# Patient Record
Sex: FEMALE | Race: WHITE | Employment: FULL TIME | URBAN - METROPOLITAN AREA
[De-identification: names, ages, dates, MRNs, and addresses within clinical notes are randomized per-mention and may not be internally consistent; named-entity substitution may affect disease eponyms.]

---

## 2024-03-12 LAB
ALBUMIN SERPL-MCNC: 4.1 G/DL (ref 3.5–5.2)
ALP SERPL-CCNC: 99 U/L (ref 35–104)
ALT SERPL-CCNC: <5 U/L (ref 0–32)
ANION GAP SERPL CALCULATED.3IONS-SCNC: 14 MMOL/L (ref 7–16)
AST SERPL-CCNC: 10 U/L (ref 0–31)
BACTERIA URNS QL MICRO: ABNORMAL
BASOPHILS # BLD: 0.06 K/UL (ref 0–0.2)
BASOPHILS NFR BLD: 1 % (ref 0–2)
BILIRUB SERPL-MCNC: 0.4 MG/DL (ref 0–1.2)
BILIRUB UR QL STRIP: NEGATIVE
BUN SERPL-MCNC: 22 MG/DL (ref 6–20)
CALCIUM SERPL-MCNC: 9.3 MG/DL (ref 8.6–10.2)
CHLORIDE SERPL-SCNC: 106 MMOL/L (ref 98–107)
CLARITY UR: CLEAR
CO2 SERPL-SCNC: 22 MMOL/L (ref 22–29)
COLOR UR: YELLOW
CREAT SERPL-MCNC: 0.7 MG/DL (ref 0.5–1)
CRYSTALS URNS MICRO: ABNORMAL /HPF
EOSINOPHIL # BLD: 0.14 K/UL (ref 0.05–0.5)
EOSINOPHILS RELATIVE PERCENT: 1 % (ref 0–6)
EPI CELLS #/AREA URNS HPF: ABNORMAL /HPF
ERYTHROCYTE [DISTWIDTH] IN BLOOD BY AUTOMATED COUNT: 14.3 % (ref 11.5–15)
GFR SERPL CREATININE-BSD FRML MDRD: >60 ML/MIN/1.73M2
GLUCOSE SERPL-MCNC: 79 MG/DL (ref 74–99)
GLUCOSE UR STRIP-MCNC: NEGATIVE MG/DL
HCT VFR BLD AUTO: 40.2 % (ref 34–48)
HGB BLD-MCNC: 12.5 G/DL (ref 11.5–15.5)
HGB UR QL STRIP.AUTO: ABNORMAL
IMM GRANULOCYTES # BLD AUTO: 0.03 K/UL (ref 0–0.58)
IMM GRANULOCYTES NFR BLD: 0 % (ref 0–5)
KETONES UR STRIP-MCNC: NEGATIVE MG/DL
LEUKOCYTE ESTERASE UR QL STRIP: NEGATIVE
LYMPHOCYTES NFR BLD: 2.37 K/UL (ref 1.5–4)
LYMPHOCYTES RELATIVE PERCENT: 24 % (ref 20–42)
MCH RBC QN AUTO: 29 PG (ref 26–35)
MCHC RBC AUTO-ENTMCNC: 31.1 G/DL (ref 32–34.5)
MCV RBC AUTO: 93.3 FL (ref 80–99.9)
MONOCYTES NFR BLD: 0.83 K/UL (ref 0.1–0.95)
MONOCYTES NFR BLD: 8 % (ref 2–12)
NEUTROPHILS NFR BLD: 65 % (ref 43–80)
NEUTS SEG NFR BLD: 6.42 K/UL (ref 1.8–7.3)
NITRITE UR QL STRIP: NEGATIVE
PH UR STRIP: 5 [PH] (ref 5–9)
PLATELET # BLD AUTO: 297 K/UL (ref 130–450)
PMV BLD AUTO: 10 FL (ref 7–12)
POTASSIUM SERPL-SCNC: 4.1 MMOL/L (ref 3.5–5)
PROT SERPL-MCNC: 7.5 G/DL (ref 6.4–8.3)
PROT UR STRIP-MCNC: NEGATIVE MG/DL
RBC # BLD AUTO: 4.31 M/UL (ref 3.5–5.5)
RBC #/AREA URNS HPF: ABNORMAL /HPF
SODIUM SERPL-SCNC: 142 MMOL/L (ref 132–146)
SP GR UR STRIP: >1.03 (ref 1–1.03)
UROBILINOGEN UR STRIP-ACNC: 0.2 EU/DL (ref 0–1)
WBC #/AREA URNS HPF: ABNORMAL /HPF
WBC OTHER # BLD: 9.9 K/UL (ref 4.5–11.5)

## 2024-06-01 ENCOUNTER — HOSPITAL ENCOUNTER (EMERGENCY)
Age: 54
Discharge: OTHER INSTITUTION | End: 2024-06-02
Attending: EMERGENCY MEDICINE
Payer: MEDICARE

## 2024-06-01 DIAGNOSIS — F39 MOOD DISORDER (HCC): Primary | ICD-10-CM

## 2024-06-01 LAB
ALBUMIN SERPL-MCNC: 4.1 G/DL (ref 3.5–5.2)
ALP SERPL-CCNC: 91 U/L (ref 35–104)
ALT SERPL-CCNC: 7 U/L (ref 0–32)
AMPHET UR QL SCN: NEGATIVE
ANION GAP SERPL CALCULATED.3IONS-SCNC: 15 MMOL/L (ref 7–16)
APAP SERPL-MCNC: <5 UG/ML (ref 10–30)
AST SERPL-CCNC: 10 U/L (ref 0–31)
BARBITURATES UR QL SCN: NEGATIVE
BASOPHILS # BLD: 0.04 K/UL (ref 0–0.2)
BASOPHILS NFR BLD: 1 % (ref 0–2)
BENZODIAZ UR QL: NEGATIVE
BILIRUB SERPL-MCNC: 0.2 MG/DL (ref 0–1.2)
BILIRUB UR QL STRIP: NEGATIVE
BUN SERPL-MCNC: 15 MG/DL (ref 6–20)
BUPRENORPHINE UR QL: NEGATIVE
CALCIUM SERPL-MCNC: 9.2 MG/DL (ref 8.6–10.2)
CANNABINOIDS UR QL SCN: NEGATIVE
CHLORIDE SERPL-SCNC: 102 MMOL/L (ref 98–107)
CLARITY UR: CLEAR
CO2 SERPL-SCNC: 21 MMOL/L (ref 22–29)
COCAINE UR QL SCN: NEGATIVE
COLOR UR: YELLOW
COMMENT: NORMAL
CREAT SERPL-MCNC: 0.8 MG/DL (ref 0.5–1)
EKG ATRIAL RATE: 50 BPM
EKG P AXIS: 35 DEGREES
EKG P-R INTERVAL: 146 MS
EKG Q-T INTERVAL: 456 MS
EKG QRS DURATION: 88 MS
EKG QTC CALCULATION (BAZETT): 415 MS
EKG R AXIS: -11 DEGREES
EKG T AXIS: 21 DEGREES
EKG VENTRICULAR RATE: 50 BPM
EOSINOPHIL # BLD: 0.1 K/UL (ref 0.05–0.5)
EOSINOPHILS RELATIVE PERCENT: 1 % (ref 0–6)
ERYTHROCYTE [DISTWIDTH] IN BLOOD BY AUTOMATED COUNT: 13.9 % (ref 11.5–15)
ETHANOLAMINE SERPL-MCNC: <10 MG/DL (ref 0–0.08)
FENTANYL UR QL: NEGATIVE
GFR, ESTIMATED: >90 ML/MIN/1.73M2
GLUCOSE SERPL-MCNC: 88 MG/DL (ref 74–99)
GLUCOSE UR STRIP-MCNC: NEGATIVE MG/DL
HCT VFR BLD AUTO: 40.5 % (ref 34–48)
HGB BLD-MCNC: 12.9 G/DL (ref 11.5–15.5)
HGB UR QL STRIP.AUTO: NEGATIVE
IMM GRANULOCYTES # BLD AUTO: <0.03 K/UL (ref 0–0.58)
IMM GRANULOCYTES NFR BLD: 0 % (ref 0–5)
KETONES UR STRIP-MCNC: NEGATIVE MG/DL
LEUKOCYTE ESTERASE UR QL STRIP: NEGATIVE
LYMPHOCYTES NFR BLD: 3.04 K/UL (ref 1.5–4)
LYMPHOCYTES RELATIVE PERCENT: 38 % (ref 20–42)
MCH RBC QN AUTO: 29.1 PG (ref 26–35)
MCHC RBC AUTO-ENTMCNC: 31.9 G/DL (ref 32–34.5)
MCV RBC AUTO: 91.4 FL (ref 80–99.9)
METHADONE UR QL: NEGATIVE
MONOCYTES NFR BLD: 0.71 K/UL (ref 0.1–0.95)
MONOCYTES NFR BLD: 9 % (ref 2–12)
NEUTROPHILS NFR BLD: 51 % (ref 43–80)
NEUTS SEG NFR BLD: 4.03 K/UL (ref 1.8–7.3)
NITRITE UR QL STRIP: NEGATIVE
OPIATES UR QL SCN: NEGATIVE
OXYCODONE UR QL SCN: NEGATIVE
PCP UR QL SCN: NEGATIVE
PH UR STRIP: 6 [PH] (ref 5–9)
PLATELET # BLD AUTO: 242 K/UL (ref 130–450)
PMV BLD AUTO: 9.4 FL (ref 7–12)
POTASSIUM SERPL-SCNC: 3.3 MMOL/L (ref 3.5–5)
PROT SERPL-MCNC: 6.7 G/DL (ref 6.4–8.3)
PROT UR STRIP-MCNC: NEGATIVE MG/DL
RBC # BLD AUTO: 4.43 M/UL (ref 3.5–5.5)
SALICYLATES SERPL-MCNC: <0.3 MG/DL (ref 0–30)
SODIUM SERPL-SCNC: 138 MMOL/L (ref 132–146)
SP GR UR STRIP: 1.01 (ref 1–1.03)
TEST INFORMATION: NORMAL
TOXIC TRICYCLIC SC,BLOOD: NEGATIVE
TROPONIN I SERPL HS-MCNC: 8 NG/L (ref 0–9)
UROBILINOGEN UR STRIP-ACNC: 0.2 EU/DL (ref 0–1)
WBC OTHER # BLD: 7.9 K/UL (ref 4.5–11.5)

## 2024-06-01 PROCEDURE — 93010 ELECTROCARDIOGRAM REPORT: CPT | Performed by: INTERNAL MEDICINE

## 2024-06-01 PROCEDURE — 80307 DRUG TEST PRSMV CHEM ANLYZR: CPT

## 2024-06-01 PROCEDURE — 85025 COMPLETE CBC W/AUTO DIFF WBC: CPT

## 2024-06-01 PROCEDURE — 81003 URINALYSIS AUTO W/O SCOPE: CPT

## 2024-06-01 PROCEDURE — 80179 DRUG ASSAY SALICYLATE: CPT

## 2024-06-01 PROCEDURE — 99285 EMERGENCY DEPT VISIT HI MDM: CPT

## 2024-06-01 PROCEDURE — 6370000000 HC RX 637 (ALT 250 FOR IP): Performed by: EMERGENCY MEDICINE

## 2024-06-01 PROCEDURE — 93005 ELECTROCARDIOGRAM TRACING: CPT | Performed by: EMERGENCY MEDICINE

## 2024-06-01 PROCEDURE — 84484 ASSAY OF TROPONIN QUANT: CPT

## 2024-06-01 PROCEDURE — 80053 COMPREHEN METABOLIC PANEL: CPT

## 2024-06-01 PROCEDURE — 80143 DRUG ASSAY ACETAMINOPHEN: CPT

## 2024-06-01 PROCEDURE — G0480 DRUG TEST DEF 1-7 CLASSES: HCPCS

## 2024-06-01 RX ORDER — FLUTICASONE PROPIONATE AND SALMETEROL 250; 50 UG/1; UG/1
1 POWDER RESPIRATORY (INHALATION) 2 TIMES DAILY
COMMUNITY

## 2024-06-01 RX ORDER — CLONAZEPAM 0.5 MG/1
0.5 TABLET ORAL ONCE
Status: DISCONTINUED | OUTPATIENT
Start: 2024-06-01 | End: 2024-06-02

## 2024-06-01 RX ORDER — ALBUTEROL SULFATE 90 UG/1
2 AEROSOL, METERED RESPIRATORY (INHALATION) EVERY 6 HOURS PRN
COMMUNITY

## 2024-06-01 RX ORDER — CITALOPRAM 40 MG/1
40 TABLET ORAL DAILY
COMMUNITY

## 2024-06-01 RX ORDER — CLONAZEPAM 0.5 MG/1
0.5 TABLET ORAL 2 TIMES DAILY PRN
COMMUNITY

## 2024-06-01 RX ORDER — PRAVASTATIN SODIUM 20 MG
20 TABLET ORAL
COMMUNITY

## 2024-06-01 RX ORDER — TRAZODONE HYDROCHLORIDE 50 MG/1
100 TABLET ORAL ONCE
Status: COMPLETED | OUTPATIENT
Start: 2024-06-01 | End: 2024-06-01

## 2024-06-01 RX ORDER — TRAZODONE HYDROCHLORIDE 100 MG/1
100 TABLET ORAL NIGHTLY
COMMUNITY

## 2024-06-01 RX ORDER — LISINOPRIL 20 MG/1
30 TABLET ORAL DAILY
COMMUNITY

## 2024-06-01 RX ORDER — LORAZEPAM 1 MG/1
2 TABLET ORAL ONCE
Status: COMPLETED | OUTPATIENT
Start: 2024-06-01 | End: 2024-06-01

## 2024-06-01 RX ORDER — PRAVASTATIN SODIUM 20 MG
20 TABLET ORAL ONCE
Status: COMPLETED | OUTPATIENT
Start: 2024-06-01 | End: 2024-06-01

## 2024-06-01 RX ADMIN — POTASSIUM BICARBONATE 40 MEQ: 782 TABLET, EFFERVESCENT ORAL at 09:39

## 2024-06-01 RX ADMIN — PRAVASTATIN SODIUM 20 MG: 20 TABLET ORAL at 22:59

## 2024-06-01 RX ADMIN — TRAZODONE HYDROCHLORIDE 100 MG: 50 TABLET ORAL at 22:58

## 2024-06-01 RX ADMIN — LORAZEPAM 2 MG: 1 TABLET ORAL at 09:39

## 2024-06-01 ASSESSMENT — LIFESTYLE VARIABLES
HOW MANY STANDARD DRINKS CONTAINING ALCOHOL DO YOU HAVE ON A TYPICAL DAY: PATIENT DOES NOT DRINK
HOW OFTEN DO YOU HAVE A DRINK CONTAINING ALCOHOL: NEVER

## 2024-06-01 ASSESSMENT — PAIN - FUNCTIONAL ASSESSMENT: PAIN_FUNCTIONAL_ASSESSMENT: NONE - DENIES PAIN

## 2024-06-01 NOTE — ED NOTES
KRYSTA called [facility] Crisis unit ([phone]) and spoke with anaid Patel about referral.     Referral to be faxed to [phone]

## 2024-06-01 NOTE — ED NOTES
KRYSTA spoke with ZOYA Arteaga to update her on the status of pt. KRYSTA Real and Antonia consulted with navigator Maryam to gather more information on pt. SW will make calls to pt's county to developmental disabilities board and group home in efforts to find out if she is connected with board services.     SW spoke with pt who was yelling, stating that she wanted to leave. She expressed that she wanted to go back to the [facility] in [place] because she could at least shower. She also stated that she was being starved, she does not like turkey and refused to eat the meal provided, observed and acknowledged by SW that her food was sitting by bedside.     Pt expressed not wanting to go back to the previous group home because she does not like it. She has a brother and sister-in-law who lives in [place] but they will not allow her to leave there and she does not get along with her brother. Pt provided SW with mom's contact number, Esther Corie, [phone] who is in the [facility].

## 2024-06-01 NOTE — ED NOTES
received a call back from [facility] and spoke to Kimani.  was informed patient arrived yesterday evening around 7 PM with no medications. Patient was exhibiting odd behavior including repeating the same information over and over again, asking for her mother/family, talking to herself, going into the bathroom every 5 minutes and leaving the door open and overall unable to care for herself. Kimani states patient is unable to do things on her own or care for herself including showering (patient reportedly asked for assistance).      was informed patient is not permitted to return due to \"it is more than what we are able to handle.\" Patient does not violent at all.

## 2024-06-01 NOTE — ED NOTES
SW called [facility] [phone] in [facility]. Mailbox is full.     SW called [facility] board of Developmental Disabilities. [phone]. SW left message.     SW called after hours number [phone], [facility] board of Developmental Disabilities. Spoke with Annabella who will provide message to on- and will return SW's call.     6:10 pm, SW received a call back from Tee Louis, MYA on-call who stated that there is a profile for pt that states that she is ineligible for services. Tee did not have any other information to provide SW at the time.     KRYSTA called pt's mom, Esther Fontenot, at [phone], Jake per verbal consent by pt. SW was on hold for over 8 minutes. KRYSTA will call back if needed.     KRYSTA called group home [facility] [place] -  at [phone]    Gaviota stated that pt was at [facility]. She visited [facility] and said that she wanted to move in there.[facility]  \"moved her in and pt went -, staying that she didn't agree to move in and left. Gaviota does not habe any concerns for SI or HI. She reported that she believes that pt may be delayed and not probably diagnosed. [facility]iota knows that pt had been living with mom and dad all of her life and mom moved into nursing home and could no longer care for. Gaviota does not know how pt got to [place]. Gaviota provided contact infromation for pt's brother, Enio, [phone].
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
## 2024-06-01 NOTE — ED PROVIDER NOTES
HPI:  6/1/24, Time: 1:33 AM EDT         Annette Fontenot is a 54 y.o. female anxiety depression presenting to the ED for needing medication refill, beginning 1 week ago.  The complaint has been persistent, moderate in severity, and worsened by nothing.  Patient reports that she is been out of her medications for a week.  Patient reporting she does have high cholesterol as well as well as anxiety.  Patient reporting feeling anxious and depressed for several weeks she reports no thoughts of harming self she reports no fever no chills she reports no abdominal pain or vomiting is no diarrhea there is no black or tarry stools or urinary symptoms.  [facility]    ROS:   Pertinent positives and negatives are stated within HPI, all other systems reviewed and are negative.  --------------------------------------------- PAST HISTORY ---------------------------------------------  Past Medical History:  has a past medical history of Anxiety and Depression.    Past Surgical History:  has no past surgical history on file.    Social History:  reports that she has never smoked. She has never used smokeless tobacco. She reports that she does not drink alcohol.    Family History: family history is not on file.     The patient’s home medications have been reviewed.    Allergies: Cogentin [benztropine], Haldol [haloperidol], and Zoloft [sertraline]    ---------------------------------------------------PHYSICAL EXAM--------------------------------------    Constitutional/General: Alert and oriented x3, anxious appearing  Head: Normocephalic and atraumatic  Eyes: PERRL, EOMI  Mouth: Oropharynx clear, handling secretions, no trismus  Neck: Supple, full ROM, non tender to palpation in the midline, no stridor, no crepitus, no meningeal signs  Pulmonary: Lungs clear to auscultation bilaterally, no wheezes, rales, or rhonchi. Not in respiratory distress  Cardiovascular:  Regular rate. Regular rhythm. No

## 2024-06-01 NOTE — PROGRESS NOTES
Called the [facility] to get information if still has a bed. Left message on voicemail to call us back.

## 2024-06-01 NOTE — ED NOTES
Patient gave verbal consent to contact group home [facility] [place] -  at [phone] to collect more collateral information. So the worker was informed patient was moved and and only stayed Saturday and Sunday and walked out and left. [place] states patient was screaming saying that staff were holding her against her will.  was informed patient is not permitted to return, however patient does still have belongings at facility.     confirmed patient does not have a current guardian, however guardianship is being worked on by [facility] [phone] in [facility].     [facility] [phone] - no answer and mailbox is full

## 2024-06-02 VITALS
HEIGHT: 64 IN | HEART RATE: 85 BPM | TEMPERATURE: 98.3 F | DIASTOLIC BLOOD PRESSURE: 64 MMHG | WEIGHT: 150 LBS | BODY MASS INDEX: 25.61 KG/M2 | RESPIRATION RATE: 16 BRPM | SYSTOLIC BLOOD PRESSURE: 132 MMHG | OXYGEN SATURATION: 98 %

## 2024-06-02 ASSESSMENT — PAIN - FUNCTIONAL ASSESSMENT: PAIN_FUNCTIONAL_ASSESSMENT: NONE - DENIES PAIN

## 2024-06-02 NOTE — ED NOTES
PT AWARE OF PLACEMENT PLAN OF CARE.  SHE REMAINS EMOTIONALLY UNSTABLE, CRYING  - STILL EXPRESSING CONFUSION ABOUT WHAT IS HAPPENING.  PT DISPLAYS MANY CHILDLIKE BEHAVIORS AND IS OBVIOUSLY IN NEED OF STABILIZATION.

## 2024-06-02 NOTE — ED NOTES
KRYSTA consulted with navigator Maryam TALAMANTES Who expressed have 1st shift to follow up with the shelters that were called so that pt can be placed back in her county of residence.

## 2024-06-02 NOTE — ED NOTES
I met with pt, who presents unstable to me.  She appears to have some cognitive delay, which may or may not be her baseline.  She has poor insight and judgement and is overall unstable.  She is anxious, nervous like and seems as if her comprehension level is limited.  Her ability to make an appropriate decision seems compromised.  Pt initially reported that she was off her meds for the past few days.  She has a hx of receiving MH and case management services.            Pt is not suicidal or homicidal - she was living in a group home, supporting the fact that she is is unable to make appropriate decisions and since leaving the group home, has not been taking her mental health medications.       At this time, it appears that pt is not able to care for her own needs and does not make appropriate decisions, as evidenced by the fact that she is in the ER, after leaving her shelter due to not being permitted to take \"a 2 hour shower, so I left\".  She also told me that she recently left a shelter because she wanted to top bunk and it was not available, therefore she was homeless and without shelter.        Pt was last admitted to [facility].

## 2024-06-02 NOTE — ED NOTES
SW received a call from Marjan, pre-screener/counselor from [facility] Crisis Unit who is declining pt. Pt has been on their unit before and has not done well, she is violent and destructive.

## 2024-06-02 NOTE — ED NOTES
SW received a call from Marjan, pre-screener/ counselor at [facility] and stated that she is declining the pt. Pt has been previously on their unit and she does not do well, she is violent and destructive.

## 2024-06-02 NOTE — ED NOTES
KRYSTA called shelters in [facility] [phone].  KRYSTA left voicemail  [facility] [phone] .  KRYSTA left voicemail     [facility] [phone], [facility] , [place]- No more than 2 weeks of clothing, cannot bring suitcases or duffle bags, only carry on/ book bag sizes. She can come at anytime, 24/7 hours.

## 2024-06-02 NOTE — ED NOTES
Pt chanting to herself, repeatedly saying \"I need my underwear back\" and yelling out about other things.  appears to be internally stimulated and very unstable.

## 2024-06-02 NOTE — ED NOTES
Patient belongings have been collected and placed in locker 29. Patient is now in gown, hospital pants and non-slip socks.

## 2024-06-02 NOTE — ED NOTES
Pt accepted to [facility] for [facility] of care within their Community.  Dr. Kathleen accepted pt to the [facility]- unit 1- bed 106B     [phone] option 2    PAS by 1400

## 2024-06-02 NOTE — ED NOTES
Pt remains to be a poor historian- delusional - she repeats phrases - chanting - over 50 times each all while laying in bed and starring at the ceiling    Pt's sister-in-law [phone] called in, inquiring about pt's status - I gave an update - pt is homeless from [place].

## 2024-06-02 NOTE — ED NOTES
Pt sitting in bed chanting \"I want my stuff back, I want my stuff back\" then \"I need a shower, I don't like to be dirty, I don't like to be dirty.\"

## 2024-06-11 ENCOUNTER — HOSPITAL ENCOUNTER (EMERGENCY)
Facility: HOSPITAL | Age: 54
Discharge: HOME | End: 2024-06-12
Payer: MEDICARE

## 2024-06-11 VITALS
RESPIRATION RATE: 18 BRPM | HEART RATE: 70 BPM | TEMPERATURE: 98.2 F | OXYGEN SATURATION: 99 % | WEIGHT: 150 LBS | HEIGHT: 65 IN | DIASTOLIC BLOOD PRESSURE: 96 MMHG | SYSTOLIC BLOOD PRESSURE: 150 MMHG | BODY MASS INDEX: 24.99 KG/M2

## 2024-06-11 DIAGNOSIS — Z00.00 WELL ADULT HEALTH CHECK: Primary | ICD-10-CM

## 2024-06-11 PROCEDURE — 99283 EMERGENCY DEPT VISIT LOW MDM: CPT | Performed by: NURSE PRACTITIONER

## 2024-06-11 PROCEDURE — 99281 EMR DPT VST MAYX REQ PHY/QHP: CPT | Performed by: NURSE PRACTITIONER

## 2024-06-11 ASSESSMENT — COLUMBIA-SUICIDE SEVERITY RATING SCALE - C-SSRS
6. HAVE YOU EVER DONE ANYTHING, STARTED TO DO ANYTHING, OR PREPARED TO DO ANYTHING TO END YOUR LIFE?: NO
2. HAVE YOU ACTUALLY HAD ANY THOUGHTS OF KILLING YOURSELF?: NO
1. IN THE PAST MONTH, HAVE YOU WISHED YOU WERE DEAD OR WISHED YOU COULD GO TO SLEEP AND NOT WAKE UP?: NO

## 2024-06-12 NOTE — ED PROVIDER NOTES
HPI   Chief Complaint   Patient presents with    Anxiety     Patient sts was released from [facility] and sent to homeless shelter/group home in [place]. Pt sts she didn't want to be there, she wanted to be in [facility]. Pt sts has no place to go but doesn't want to be in [place] or stay in that group home. Pt sts she has no medical complaints but sts she is homeless and has no place to go, that is why she is here. Pt sts she was scared and had no where else to go. No si/hi.        Patient is healthy nontoxic-appearing 54-year-old female with no past medical history on file, presents the emergency room today for complaint of homelessness.  Patient states she was recently discharged from [facility] where she was residing.  Patient states she was given medication refills and encouraged to stay at the home shelter in [place].  Patient states she did not like the location and left.  Patient states he is contacted several family members attempt to stay at their place however family members have refused.  Patient states she has no car or way to get back to [place].  Patient denies any headache pain, visual disturbance, numbness or tingling, neck pain, chest pain, shortness of breath difficulty breathing, abdominal pain, nausea, vomiting, diarrhea or constipation, fever, shaking, or chills, urinary complaints.                          Gregorio Coma Scale Score: 15                     Patient History   History reviewed. No pertinent past medical history.  History reviewed. No pertinent surgical history.  No family history on file.  Social History     Tobacco Use    Smoking status: Not on file    Smokeless tobacco: Not on file   Substance Use Topics    Alcohol use: Not on file    Drug use: Not on file       Physical Exam   ED Triage Vitals [06/11/24 2046]   Temperature Heart Rate Respirations BP   36.8 °C (98.2 °F) 70 18 (!) 150/96      Pulse Ox Temp Source Heart Rate Source Patient Position   99 % Temporal  Monitor Sitting      BP Location FiO2 (%)     -- --       Physical Exam  Vitals and nursing note reviewed. Exam conducted with a chaperone present.   Constitutional:       General: She is not in acute distress.     Appearance: Normal appearance. She is not ill-appearing, toxic-appearing or diaphoretic.      Interventions: She is not intubated.  HENT:      Head: Normocephalic.      Right Ear: Tympanic membrane, ear canal and external ear normal.      Left Ear: Tympanic membrane, ear canal and external ear normal.      Nose: Nose normal.      Mouth/Throat:      Mouth: Mucous membranes are moist.      Pharynx: No oropharyngeal exudate or posterior oropharyngeal erythema.   Eyes:      General:         Right eye: No discharge.         Left eye: No discharge.      Extraocular Movements: Extraocular movements intact.      Pupils: Pupils are equal, round, and reactive to light.   Neck:      Vascular: No carotid bruit.   Cardiovascular:      Rate and Rhythm: Normal rate and regular rhythm.      Pulses: Normal pulses. No decreased pulses.      Heart sounds: Normal heart sounds. Heart sounds not distant. No murmur heard.     No friction rub. No gallop.   Pulmonary:      Effort: Pulmonary effort is normal. No tachypnea, bradypnea, accessory muscle usage, prolonged expiration, respiratory distress or retractions. She is not intubated.      Breath sounds: Normal breath sounds. No stridor, decreased air movement or transmitted upper airway sounds. No decreased breath sounds, wheezing, rhonchi or rales.   Chest:      Chest wall: No tenderness.   Abdominal:      General: Abdomen is flat. Bowel sounds are normal.      Palpations: Abdomen is soft.   Musculoskeletal:         General: Normal range of motion.      Cervical back: Normal range of motion and neck supple. No rigidity or tenderness.   Lymphadenopathy:      Cervical: No cervical adenopathy.   Skin:     General: Skin is warm and dry.      Capillary Refill: Capillary refill  takes less than 2 seconds.   Neurological:      General: No focal deficit present.      Mental Status: She is alert and oriented to person, place, and time.         ED Course & MDM   Diagnoses as of 06/12/24 0228   Well adult health check       Medical Decision Making  Given patient's complaint presentation a thorough exam was performed.  Patient has no complaints upon arrival to the emergency room, remains hemodynamically stable during emergency evaluation, is afebrile, no adventitious lung sounds auscultated, speaking complete sentences no respiratory distress, cardiac sounds auscultated are regular, no reproducible abdominal tenderness upon palpation with bowel sounds present in all 4 quadrants, no CVA tenderness upon palpation and I have a low suspicion for acute intracranial process, meningitis, mastoiditis, ACS, pulm embolism, aortic abdominal aneurysm.  Patient requesting medication refill be performed however documentation provided reveals patient's medications were refilled 2 days ago and sent to [facility] in [place].  Given patient has no medical emergencies at this time I do not believe any imaging or lab work is warranted.  Patient [facility] card and I encouraged following up with primary care provider as needed however symptoms become worse return to emergency room for further evaluation.  Patient was agreeable with this plan discharged home in stable condition.    MIRANDA Renee     Portions of this note were generated using digital voice recognition software, and may contain grammatical errors      Procedure  Procedures     MIRANDA Renee  06/12/24 0228

## 2024-06-12 NOTE — ED TRIAGE NOTES
Secondary to patient volumes and overcrowding, I performed a brief medical screening exam of the patient in triage, as the patient awaits space in the main ED.    History of Present Illness:  Candelaria Ag presents with   Chief Complaint   Patient presents with    Anxiety     Patient sts was [facility] and sent to homeless shelter/group home in [place]. Pt sts she didn't want to be there, she wanted to be in [facility]. Pt sts has no place to go but doesn't want to be in [place] or stay in that group home. Pt sts she has no medical complaints but sts she is homeless and has no place to go, that is why she is here. Pt sts she was scared and had no where else to go. No si/hi.        Physical Exam:  General - In no acute distress  Respiratory - Breathing comfortably  Cardiac - Normal S1, S2, no m/g/r  Neurologic - Moving all extremities  Psych-pressured speech, very agitated, hyperventilating    Medical Decision Making:  Patient will require further evaluation in the main ED.    Initial diagnostic tests were ordered from triage.      The patient demonstrates understanding that this initial evaluation is a brief medical screening exam and the expectation is that they await for space in the main ED to be further evaluated.  The patient understands that, if they leave prior to further evaluation in the main ED after this initial evaluation in triage, they are doing so under their own accord knowing that their evaluation/work-up is not yet complete. The patient also understands that any preliminary diagnostic results, including abnormalities, may not be shared with them, if they choose to leave prior to further evaluation in the main ED.